# Patient Record
Sex: FEMALE | Race: WHITE | Employment: FULL TIME | URBAN - METROPOLITAN AREA
[De-identification: names, ages, dates, MRNs, and addresses within clinical notes are randomized per-mention and may not be internally consistent; named-entity substitution may affect disease eponyms.]

---

## 2017-02-01 ENCOUNTER — OFFICE VISIT (OUTPATIENT)
Dept: FAMILY MEDICINE CLINIC | Facility: CLINIC | Age: 27
End: 2017-02-01

## 2017-02-01 VITALS
TEMPERATURE: 99 F | RESPIRATION RATE: 18 BRPM | OXYGEN SATURATION: 98 % | BODY MASS INDEX: 19 KG/M2 | WEIGHT: 120 LBS | DIASTOLIC BLOOD PRESSURE: 70 MMHG | HEART RATE: 119 BPM | SYSTOLIC BLOOD PRESSURE: 100 MMHG

## 2017-02-01 DIAGNOSIS — J01.40 ACUTE PANSINUSITIS, RECURRENCE NOT SPECIFIED: Primary | ICD-10-CM

## 2017-02-01 PROCEDURE — 99213 OFFICE O/P EST LOW 20 MIN: CPT | Performed by: NURSE PRACTITIONER

## 2017-02-01 RX ORDER — DOXYCYCLINE 100 MG/1
100 TABLET ORAL 2 TIMES DAILY
Qty: 20 TABLET | Refills: 0 | Status: SHIPPED | OUTPATIENT
Start: 2017-02-01 | End: 2017-02-11

## 2017-02-01 RX ORDER — LAMOTRIGINE 200 MG/1
TABLET ORAL
Refills: 2 | COMMUNITY
Start: 2017-01-27

## 2017-02-01 RX ORDER — TRANYLCYPROMINE SULFATE 10 MG/1
TABLET, FILM COATED ORAL
Refills: 2 | COMMUNITY
Start: 2017-01-07

## 2017-02-01 RX ORDER — ONDANSETRON 4 MG/1
TABLET, ORALLY DISINTEGRATING ORAL
Refills: 3 | COMMUNITY
Start: 2017-01-19

## 2017-02-01 RX ORDER — LITHIUM CARBONATE 150 MG/1
CAPSULE ORAL
COMMUNITY
Start: 2017-01-14

## 2017-02-01 RX ORDER — LORAZEPAM 1 MG/1
TABLET ORAL
Refills: 3 | COMMUNITY
Start: 2017-01-12

## 2017-02-01 RX ORDER — CHLORPROMAZINE HYDROCHLORIDE 10 MG/1
TABLET, FILM COATED ORAL
Refills: 3 | COMMUNITY
Start: 2017-01-17

## 2017-02-01 NOTE — PROGRESS NOTES
CHIEF COMPLAINT:   Patient presents with:  Sinus Problem      HPI:   Bertha Pink is a 32year old female who presents for cold symptoms for  2 weeks.   URI sx started like a cold, similar to previous episode in December 2016, sinus pain, radiating to neck an post op cholecystectomy, was placed on ventilator for 2-4 hours for unknown reason, anesthesia records pending   • IBS (irritable bowel syndrome)           Past Surgical History    APPENDECTOMY  July 2010    HERNIA SURGERY  8/2010    Comment Inguinal Her EARS: TM's intact, no bulging, no retraction, no fluid, bony landmarks present  NOSE: nostrils patent, thick clear nasal mucous, nasal mucosa reddened and edematous  THROAT: oral mucosa pink, moist. No visible dental caries.  Posterior pharynx is without er The sinuses are air-filled spaces within the bones of the face. They connect to the inside of the nose. Sinusitis is an inflammation of the tissue lining the sinus cavity. Sinus inflammation can occur during a cold.  It can also be due to allergies to polle · Do not use nasal rinses or irrigation during an acute sinus infection, unless told to by your health care provider. Rinsing may spread the infection to other sinuses.   · Use acetaminophen or ibuprofen to control pain, unless another pain medicine was pre

## (undated) NOTE — MR AVS SNAPSHOT
[facility] [place]  [place] [address]  [phone]               Thank you for choosing us for your health care visit with NIKKI Jimenez.   We are glad to serve you and happy to provide you with this summary of yo · Over-the-counter decongestants may be used unless a similar medicine was prescribed. Nasal sprays work the fastest. Use one that contains phenylephrine or oxymetazoline. First blow the nose gently. Then use the spray.  Do not use these medicines more ofte [address]. All rights reserved. This information is not intended as a substitute for professional medical care. Always follow your healthcare professional's instructions.              Your Appointments     Feb 01, 2017  1:45 PM   [facility]-VISIT with Elida Marquis [employer] will allow you to access patient instructions from your recent visit,  view other health information, and more. To sign up or find more information, go to [email or web address] and click on the Sign Up Now link in the [employer] box.      Enter